# Patient Record
(demographics unavailable — no encounter records)

---

## 2024-10-16 NOTE — ASSESSMENT
[FreeTextEntry1] : possible viral syndrome with productive cough and worsening sinus symptoms without chest pain or shortness of breath. Tolerating PO

## 2024-10-16 NOTE — HISTORY OF PRESENT ILLNESS
[Home] : at home, [unfilled] , at the time of the visit. [Other Location: e.g. Home (Enter Location, City,State)___] : at [unfilled] [Verbal consent obtained from patient] : the patient, [unfilled] [FreeTextEntry8] : 39 yo female with PMH pneumonia in April for evaluation of cold-like symptoms x 10 days. Now with worsening sinus symptoms, phlegm is green. +cough. Reports she has been taking flonase and zyrtec. +sick contacts - wife and daughter, similar symptoms. Did not test for covid had covid/flu few weeks ago. Denies fevers, chest pain, shortness of breath. Medications: none NKDA LMP: reports no chance of pregnancy

## 2024-10-16 NOTE — PLAN
[With new medications prescribed] : Treat in place: with new medications prescribed [FreeTextEntry1] : -rx Augmentin -f/u with hepatologist -Red flag symptoms for immediate ER evaluation discussed. pt expresses full understanding and agrees.

## 2024-12-18 NOTE — REVIEW OF SYSTEMS
[Fever] : fever [Chills] : chills [Fatigue] : fatigue [Chest Pain] : no chest pain [Shortness Of Breath] : no shortness of breath [Wheezing] : no wheezing [Cough] : cough [Abdominal Pain] : no abdominal pain [Nausea] : nausea [Dysuria] : no dysuria [Skin Rash] : no skin rash [FreeTextEntry4] : Nasal congestion

## 2024-12-18 NOTE — ASSESSMENT
[FreeTextEntry1] : On virtual exam patient well-appearing with occasional cough in no acute respiratory distress Impression: Influenza A

## 2024-12-18 NOTE — PHYSICAL EXAM
[No Acute Distress] : no acute distress [Well Nourished] : well nourished [Well Developed] : well developed [Normal Sclera/Conjunctiva] : normal sclera/conjunctiva [Normal Outer Ear/Nose] : the outer ears and nose were normal in appearance [No Respiratory Distress] : no respiratory distress  [No Accessory Muscle Use] : no accessory muscle use [No Rash] : no rash [No Focal Deficits] : no focal deficits [Normal Affect] : the affect was normal [Normal Insight/Judgement] : insight and judgment were intact

## 2024-12-18 NOTE — HISTORY OF PRESENT ILLNESS
[Home] : at home, [unfilled] , at the time of the visit. [Other Location: e.g. Home (Enter Location, City,State)___] : at [unfilled] [Verbal consent obtained from patient] : the patient, [unfilled] [FreeTextEntry8] : 39-year-old female diagnosed with influenza A yesterday at [facility] complaining of fever which is difficult to control with ibuprofen and Aleve.  Patient states symptoms started 4 days ago.  Patient was not prescribed Tamiflu secondary to being out of the 72-hour window based on her symptom onset.  Patient had a negative chest x-ray as per radiology.  Patient complaining of increased cough with yellowish sputum at times and admits to increased nausea with episode of posttussive vomiting.  Patient denies any associated abdominal pain, diarrhea, shortness of breath or syncope

## 2024-12-23 NOTE — ASSESSMENT
[FreeTextEntry1] : Likely post-viral sinusitis.  No real c/f deep space infection or abscess.  Pt appears well and nontoxic; no indication for ED eval at this time.

## 2024-12-23 NOTE — PLAN
[FreeTextEntry1] : Augmentin c/w Flonase  NSAIDs/APAP Fluids, decongestants PCP/ENT f/u ED precautions discussed

## 2024-12-23 NOTE — PHYSICAL EXAM
[EOMI] : extraocular movements intact [de-identified] : PLEASE SEE HPI FOR ADDITIONAL RELEVANT PHYSICAL EXAM FINDINGS GENERAL: no acute distress, nontoxic HEAD: normocephalic.  percussion of cheeks reproduces pain.  Bending at waist reproduces pain. EYES: no scleral icterus NECK: trachea midline, Full ROM RESP: no respiratory distress ABD: nondistended MSK: no gross deformity NEURO: alert & fully oriented SKIN: no rash PSYCH: cooperative, good insight, appropriate, fluent speech

## 2024-12-23 NOTE — HISTORY OF PRESENT ILLNESS
[Home] : at home, [unfilled] , at the time of the visit. [Other Location: e.g. Home (Enter Location, City,State)___] : at [unfilled] [FreeTextEntry8] : 39 y F no pmh c recent FluA dx last week, seen by [facility] 5 days ago and rx'd tessilon and zofran for post-tussive vomiting now with 1-2 days facial pressure, sinus congestion, new purulent nasal drainage and foul odor No new fever, vision changes Patient has frequent sinusitis, states symptoms are c/w these prior episodes NKDA LMP years ago

## 2025-01-07 NOTE — PHYSICAL EXAM
[No Respiratory Distress] : no respiratory distress  [No Accessory Muscle Use] : no accessory muscle use [No Joint Swelling] : no joint swelling [Grossly Normal Strength/Tone] : grossly normal strength/tone [de-identified] : PLEASE SEE HPI FOR ADDITIONAL RELEVANT PHYSICAL EXAM FINDINGS GENERAL: no acute distress, nontoxic HEAD: normocephalic EYES: no scleral icterus NECK: trachea midline, Full ROM RESP: no respiratory distress ABD: nondistended MSK: no gross deformity NEURO: alert & fully oriented SKIN: no rash PSYCH: cooperative, good insight, appropriate, fluent speech  [de-identified] : ambulatory during exam, grossly able to fully flex hip

## 2025-01-07 NOTE — HISTORY OF PRESENT ILLNESS
[Home] : at home, [unfilled] , at the time of the visit. [Other Location: e.g. Home (Enter Location, City,State)___] : at [unfilled] [Verbal consent obtained from patient] : the patient, [unfilled] [FreeTextEntry8] : Very pleasant 39 y F seen 2-3 wk ago for flu still with some residual post viral cough after Flu A at the end of December.  Still with some congestion and post nasal drip but no n/v, f/c.  No new or worsening respiratory/pulmonary symptom  Separately, has L hip/groin pain ~1 month after sudden hyperabduction of hips (splits) slipping on water.  Not worsening but not improving.  Anterior hip, some radiation posteriorly.  Feels and occasional click with change in position that is not painful.  Had been improving over the course of the month but now worsneing after playing with her daughter.  Minimal at rest, worse when walking.  No new or features, no paresthesias, no motor weakness. No urinary/ symptoms No abd pain LMP 1 mon ago

## 2025-01-07 NOTE — ASSESSMENT
[FreeTextEntry1] : Separate problems: 1. Post viral cough; doubt post influenza PNA.  No indication for ED eval.  Would benefit from inhaler 2. L hip pain.  Likely MSK though discussed c pt that GI/ causes could not be safely evaluated and as such she should be seen in person for symptoms asap.  C/W APAP.  PT rx and X-ray ordered.

## 2025-05-13 NOTE — PLAN
[FreeTextEntry1] : Plan:  Wash the area thoroughly with soap and water. Monitor the site of puncture closely for signs of infection. Evidence of redness, swelling, increased pain, discharge or fever are all signs of potential infection. If this occurs, immediately call back for reassessment. If any concern, consider evaluation in the emergency department. Otherwise follow up with PCP later this week.

## 2025-05-13 NOTE — HISTORY OF PRESENT ILLNESS
[Home] : at home, [unfilled] , at the time of the visit. [Other Location: e.g. Home (Enter Location, City,State)___] : at [unfilled] [Telehealth (audio & video)] : This visit was provided via telehealth using real-time 2-way audio visual technology. [Verbal consent obtained from patient] : the patient, [unfilled] [FreeTextEntry8] : 38 yo woman with no significant PMHx presents after a pin punctured her foot at the heel almost one inch.  She states that it bled for two minutes and then stopped.  She was unsure of tetanus status, but she has a 5 year old, so we determined she did received Tdap at that time.  Currently there is no swelling, discharge or redness.  She has not washed the area since the injury.

## 2025-05-13 NOTE — ASSESSMENT
[FreeTextEntry1] : 40 yo woman with no significant PMHx presents after a pin punctured her foot at the heel almost one inch.  She states that it bled for two minutes and then stopped.  She was unsure of tetanus status, but she has a 5 year old, so we determined she did received Tdap at that time.  Currently there is no swelling, discharge or redness.  She has not washed the area since the injury.    Assessment:  deep puncture of skin by pin

## 2025-05-13 NOTE — PHYSICAL EXAM
[No Acute Distress] : no acute distress [Well Nourished] : well nourished [Well Developed] : well developed [Well-Appearing] : well-appearing [Normal Sclera/Conjunctiva] : normal sclera/conjunctiva [Normal Outer Ear/Nose] : the outer ears and nose were normal in appearance [Supple] : supple [No Respiratory Distress] : no respiratory distress  [No Accessory Muscle Use] : no accessory muscle use [No Rash] : no rash [Speech Grossly Normal] : speech grossly normal [Normal Affect] : the affect was normal [Alert and Oriented x3] : oriented to person, place, and time [Normal Insight/Judgement] : insight and judgment were intact [de-identified] : Clean puncture; no overt evidence of trauma

## 2025-05-23 NOTE — ASSESSMENT
[FreeTextEntry1] : Hip pain, unspecified.  Tendinosis v OA.  Had improved with PT and no new trauma; reasonable to restart.  Recommended ortho referral

## 2025-05-23 NOTE — PHYSICAL EXAM
[de-identified] : PLEASE SEE HPI FOR ADDITIONAL RELEVANT PHYSICAL EXAM FINDINGS GENERAL: no acute distress, nontoxic HEAD: normocephalic EYES: no scleral icterus NECK: trachea midline, Full ROM/supple RESP: no respiratory distress, no obvious wheeze or stridor, no accessory muscle use noted ABD: nondistended MSK: no gross deformity NEURO: alert & fully oriented SKIN: no rash PSYCH: cooperative, good insight, appropriate, fluent speech

## 2025-05-23 NOTE — HISTORY OF PRESENT ILLNESS
[Home] : at home, [unfilled] , at the time of the visit. [Other Location: e.g. Home (Enter Location, City,State)___] : at [unfilled] [Telehealth (audio & video)] : This visit was provided via telehealth using real-time 2-way audio visual technology. [Verbal consent obtained from patient] : the patient, [unfilled] [FreeTextEntry8] : 39 y F seen previously by [facility] for various ailments including hip flexor pain, went to PT Jan/Feb this year but was not able to do PT after.  Pain with bending, flexing hip, feels "fullness" and ache in L hip.  No new features or new trauma.  Occasionally will radiate to buttock but not currently.  PT was helping with her symptoms No new trauma Needs renewal of PT rx as she tried to restart sessions but was not able No GI/ symptoms No paresthesias/weakness. Has not f/u c PCP or orthopedist